# Patient Record
Sex: MALE | Race: WHITE
[De-identification: names, ages, dates, MRNs, and addresses within clinical notes are randomized per-mention and may not be internally consistent; named-entity substitution may affect disease eponyms.]

---

## 2017-01-15 ENCOUNTER — HOSPITAL ENCOUNTER (EMERGENCY)
Dept: HOSPITAL 25 - UCCORT | Age: 52
Discharge: HOME | End: 2017-01-15
Payer: COMMERCIAL

## 2017-01-15 VITALS — SYSTOLIC BLOOD PRESSURE: 144 MMHG | DIASTOLIC BLOOD PRESSURE: 101 MMHG

## 2017-01-15 DIAGNOSIS — J40: Primary | ICD-10-CM

## 2017-01-15 PROCEDURE — G0463 HOSPITAL OUTPT CLINIC VISIT: HCPCS

## 2017-01-15 PROCEDURE — 99212 OFFICE O/P EST SF 10 MIN: CPT

## 2017-01-15 NOTE — UC
Respiratory Complaint HPI





- HPI Summary


HPI Summary: 





URI for 3 weeks. Wants an antibiotic. His wife had same, got an antibiotic and 

is now better. Started with ST, head congestion, then moved to chest. Coughing 

up green phlegm in morning, "I still feel like there's more stuff down there." 

No further sinus congestion or ST. Good appetite. Cough is better as day goes 

on. No fever.





- History of Current Complaint


Chief Complaint: UCRespiratory


Stated Complaint: CHEST CONGESTION


Time Seen by Provider: 01/15/17 12:29


Hx Obtained From: Patient


Onset/Duration: Gradual Onset, Lasting Weeks - 3


Timing: Constant


Severity Initially: Mild


Severity Currently: Mild


Character: Cough: Productive - green, thick phlegm mainly in AM on first 

awakening


Aggravating Factors: Recumbent Position


Alleviating Factors: Nothing


Associated Signs And Symptoms: Positive: URI, Hoarseness.  Negative: Dyspnea, 

Fever, Chills, Hemoptysis





- Risk Factors


Pulmonary Embolism Risk Factors: Negative


Cardiac Risk Factors: Negative


Pseudomonas Risk Factors: Negative


Tuberculosis Risk Factors: Negative





- Allergies/Home Medications


Allergies/Adverse Reactions: 


 Allergies











Allergy/AdvReac Type Severity Reaction Status Date / Time


 


No Known Allergies Allergy   Verified 01/15/17 12:05














PMH/Surg Hx/FS Hx/Imm Hx


Previously Healthy: Yes





- Surgical History


Surgical History: Yes


Surgery Procedure, Year, and Place: APPY





- Family History


Known Family History: Positive: Hypertension





- Social History


Occupation: Employed Full-time


Lives: With Family


Alcohol Use: Occasionally


Substance Use Type: None


Smoking Status (MU): Never Smoked Tobacco





- Immunization History


Most Recent Tetanus Shot: GREATER THAN LAST 5 YRS





Review of Systems


Constitutional: Negative


Skin: Negative


Eyes: Negative


ENT: Negative


Respiratory: Cough


Cardiovascular: Negative


Gastrointestinal: Negative


Genitourinary: Negative


Motor: Negative


Neurovascular: Negative


Musculoskeletal: Negative


Neurological: Negative


Psychological: Negative


All Other Systems Reviewed And Are Negative: Yes





Physical Exam


Triage Information Reviewed: Yes


Appearance: Well-Appearing, No Pain Distress, Well-Nourished


Vital Signs: 


 Initial Vital Signs











Temp  98.9 F   01/15/17 11:54


 


Pulse  66   01/15/17 11:54


 


Resp  18   01/15/17 11:54


 


BP  144/101   01/15/17 11:54


 


Pulse Ox  100   01/15/17 11:54











Vital Signs Reviewed: Yes


Eye Exam: Normal


ENT: Positive: Pharyngeal erythema - mild, TMs normal.  Negative: Nasal 

congestion, Nasal drainage, Tonsillar swelling, Tonsillar exudate, Trismus, 

Muffled/hoarse voice


Neck exam: Normal


Respiratory Exam: Normal


Respiratory: Positive: No respiratory distress, No accessory muscle use, 

Rhonchi - ariel upper airway


Cardiovascular Exam: Normal


Musculoskeletal Exam: Normal


Neurological Exam: Normal


Psychological Exam: Normal


Skin Exam: Normal





UC Diagnostic Evaluation





- Laboratory


O2 Sat by Pulse Oximetry: 100





Respiratory Course/Dx





- Differential Dx/Diagnosis


Differential Diagnosis/HQI/PQRI: Bronchitis, Lower Resp Infection, Sinusitis


Provider Diagnoses: bronchitis





Discharge





- Discharge Plan


Condition: Stable


Disposition: HOME


Prescriptions: 


Azithromycin TAB* [Zithromax TAB (Z-JUAN) 250 mg #6 tabs] 2 tab PO .TODAY, THEN 

1 DAILY #1 juan


Patient Education Materials:  Acute Bronchitis (ED)


Referrals: 


Sondra Nieves PA [Primary Care Provider] -

## 2017-11-18 ENCOUNTER — HOSPITAL ENCOUNTER (EMERGENCY)
Dept: HOSPITAL 25 - UCCORT | Age: 52
Discharge: HOME | End: 2017-11-18
Payer: COMMERCIAL

## 2017-11-18 VITALS — SYSTOLIC BLOOD PRESSURE: 150 MMHG | DIASTOLIC BLOOD PRESSURE: 81 MMHG

## 2017-11-18 DIAGNOSIS — B95.62: ICD-10-CM

## 2017-11-18 DIAGNOSIS — L03.113: Primary | ICD-10-CM

## 2017-11-18 PROCEDURE — 99212 OFFICE O/P EST SF 10 MIN: CPT

## 2017-11-18 PROCEDURE — 87205 SMEAR GRAM STAIN: CPT

## 2017-11-18 PROCEDURE — G0463 HOSPITAL OUTPT CLINIC VISIT: HCPCS

## 2017-11-18 PROCEDURE — 87077 CULTURE AEROBIC IDENTIFY: CPT

## 2017-11-18 PROCEDURE — 87070 CULTURE OTHR SPECIMN AEROBIC: CPT

## 2017-11-18 PROCEDURE — 87641 MR-STAPH DNA AMP PROBE: CPT

## 2017-11-18 PROCEDURE — 87186 SC STD MICRODIL/AGAR DIL: CPT

## 2017-11-18 PROCEDURE — 87640 STAPH A DNA AMP PROBE: CPT

## 2017-11-18 NOTE — UC
Skin Complaint HPI





- HPI Summary


HPI Summary: 


Had bite/ sting and got infected. Treated with augmentin for 3 days without 

improvement. Wife h/o MRSA





- History of Current Complaint


Chief Complaint: UCUpperExtremity


Time Seen by Provider: 11/18/17 09:34


Stated Complaint: RIGHT ARM SKIN COMPLAINT


Hx Obtained From: Patient


Onset/Duration: Sudden Onset - 1, Worse Since - last 3 days


Skin Exposure Onset/Duration: Weeks Ago - 1


Onset Severity: Mild


Current Severity: Moderate


Location: Discrete - right lateral posterior forearm


Character: Swelling, Redness, Painful


Aggravating Factor(s): Touch


Alleviating Factor(s): Nothing


Associated Signs & Symptoms: Positive: Tenderness.  Negative: Nausea, Vomiting, 

Thirst, Diaphoresis, Fever, Chills, Rash


Related History: Insect Bite/Sting





- Allergy/Home Medications


Allergies/Adverse Reactions: 


 Allergies











Allergy/AdvReac Type Severity Reaction Status Date / Time


 


No Known Allergies Allergy   Verified 11/18/17 09:21











Home Medications: 


 Home Medications





Iron Tab 1 tab PO QAM 11/18/17 [History Confirmed 11/18/17]


Omega-3 Fatty Acids [Fish Oil] 1,000 mg PO BID 11/18/17 [History Confirmed 11/18 /17]











Review of Systems


Skin: Other - Redness swelling drainage right forearm


Is Patient Immunocompromised?: No


All Other Systems Reviewed And Are Negative: Yes





PMH/Surg Hx/FS Hx/Imm Hx


Previously Healthy: Yes





- Surgical History


Surgical History: Yes


Surgery Procedure, Year, and Place: APPY





- Family History


Known Family History: Positive: Hypertension


   Negative: Diabetes





- Social History


Occupation: Employed Full-time


Lives: With Family


Alcohol Use: Occasionally


Substance Use Type: None


Smoking Status (MU): Never Smoked Tobacco


Have You Smoked in the Last Year: No





- Immunization History


Most Recent Tetanus Shot: GREATER THAN LAST 5 YRS





Physical Exam


Triage Information Reviewed: Yes


Appearance: Well-Appearing, No Pain Distress, Well-Nourished


Vital Signs: 


 Initial Vital Signs











Temp  98.7 F   11/18/17 09:15


 


Pulse  83   11/18/17 09:15


 


Resp  20   11/18/17 09:15


 


BP  150/81   11/18/17 09:15











Vital Signs Reviewed: Yes


Eyes: Positive: Conjunctiva Clear


Neck exam: Normal


Respiratory Exam: Normal


Cardiovascular Exam: Normal


Bowel Sounds: Positive: Absent


Neurological Exam: Normal


Psychological Exam: Normal


Skin: Positive: Other - erythema on the right forearm 80w35av with central 

induration with scab 8x10cm. No drainage.





Course/Dx





- Course


Course Of Treatment: Not responding to augmentin. ? MRSA, will change to 

Bactrim.





- Differential Diagnoses - Skin Complaint


Differential Diagnoses: Abscess, Cellulitis, Lymphadenitis, Lymphangitis





- Diagnoses


Provider Diagnoses: Cellulitis right arm





Discharge





- Discharge Plan


Condition: Stable


Disposition: HOME


Prescriptions: 


Sulfamethox/Trimethoprim DS* [Bactrim /160 TAB*] 1 tab PO BID #20 tab


Patient Education Materials:  Cellulitis (ED), Sulfamethoxazole/Trimethoprim (

By mouth)


Referrals: 


Sondra Nieves PA [Primary Care Provider] -

## 2018-04-23 ENCOUNTER — HOSPITAL ENCOUNTER (OUTPATIENT)
Dept: HOSPITAL 25 - OR | Age: 53
Discharge: HOME | End: 2018-04-23
Attending: ORTHOPAEDIC SURGERY
Payer: COMMERCIAL

## 2018-04-23 VITALS — DIASTOLIC BLOOD PRESSURE: 84 MMHG | SYSTOLIC BLOOD PRESSURE: 130 MMHG

## 2018-04-23 DIAGNOSIS — M20.12: Primary | ICD-10-CM

## 2018-04-23 DIAGNOSIS — M20.42: ICD-10-CM

## 2018-04-23 PROCEDURE — C1713 ANCHOR/SCREW BN/BN,TIS/BN: HCPCS

## 2018-04-23 PROCEDURE — C1776 JOINT DEVICE (IMPLANTABLE): HCPCS

## 2018-04-23 PROCEDURE — 76001: CPT

## 2018-04-23 NOTE — RAD
Indication: LEFT foot first metatarsal fusion.



Comparison: April 04, 2018



Technique: 3 seconds fluoroscopy.



Report: Spot images document a cortical plate and multiple screws traversing the first

metatarsal phalangeal joint as well as percutaneous fixation wires projecting through the

second, third, and fourth toes as far proximal as the metatarsal phalangeal joints.

Pre-existing fixation wire fragment at the third proximal phalanx.



IMPRESSION:  Intraoperative fluoroscopy.

## 2018-04-23 NOTE — OP
OPERATIVE REPORT:

 

DATE OF OPERATION:  04/23/18 - [facility]

 

DATE OF BIRTH:  [birth year]

 

ATTENDING SURGEON:  Calos Calabrese MD

 

ASSISTANT:  Nohemy De La Vega PA-C



ANESTHESIOLOGIST:  Sam Jones MD



ANESTHESIA:  MAC.

 

PRE-OP DIAGNOSIS:  Left forefoot hallux valgus with 2nd, 3rd, and 4th 
interphalangeal deformity.

 

POST-OP DIAGNOSIS:  Left forefoot hallux valgus with 2nd, 3rd, and 4th 
interphalangeal deformity.

 

OPERATIVE PROCEDURE:  Left 1st MTP joint fusion, 2nd MTP joint capsulectomy, 
2nd and 3rd PIP resection arthroplasty, and 4th DIP resection arthroplasty.

 

DESCRIPTION OF PROCEDURE:  The patient was taken to the operating room where a 
longitudinal incision was made over the dorsum of the 1st MTP joint.  Medial 
lateral flap was raised and we used the cone and cup reamers to prepare the 
joint for arthrodesis.  An oblique 3.0 mm cannulated screw was placed across 
the joint and then a dorsal Arthrex 1st MTP fusion plate.  This was fixed with 
a combination of locking and unlocking screws.

 

We then made a longitudinal incision lateral to the 2nd MTP joint releasing the 
lateral collateral ligament and the brevis tendon.  At the PIP level, we made a 
transverse elliptical incision, removing the condyles with a small sagittal saw 
and then pinning the entire 2nd toe longitudinally with a 0.062 C-wire.

 

The 3rd toe hammer toe was approached through a transverse elliptical dorsal 
incision with a similar approach removing the condyles with a small sagittal 
saw and pinning this longitudinally with a 0.062 C-wire.  The 4th toe had more 
of a mallet toe deformity, so a DIP resection arthroplasty was performed again 
through a transverse elliptical incision removing the condyles and pinning this 
longitudinally with a similar pin.  X-rays intraoperatively showed satisfactory 
position of all hardware.  The 4th toe pin missed the MTP joint, but this was 
not important as this was a DIP resection arthroplasty.  All wounds were 
irrigated thoroughly and closed with Vicryl when needed, nylon for the skin and 
a compression dressing applied.

 

 [id number]/CPS #: [id number]

[facility]